# Patient Record
Sex: MALE | Race: WHITE | URBAN - METROPOLITAN AREA
[De-identification: names, ages, dates, MRNs, and addresses within clinical notes are randomized per-mention and may not be internally consistent; named-entity substitution may affect disease eponyms.]

---

## 2020-12-23 ENCOUNTER — APPOINTMENT (RX ONLY)
Dept: URBAN - METROPOLITAN AREA CLINIC 141 | Facility: CLINIC | Age: 55
Setting detail: DERMATOLOGY
End: 2020-12-23

## 2020-12-23 DIAGNOSIS — L20.89 OTHER ATOPIC DERMATITIS: ICD-10-CM

## 2020-12-23 DIAGNOSIS — L85.3 XEROSIS CUTIS: ICD-10-CM

## 2020-12-23 PROBLEM — L20.84 INTRINSIC (ALLERGIC) ECZEMA: Status: ACTIVE | Noted: 2020-12-23

## 2020-12-23 PROCEDURE — ? COUNSELING

## 2020-12-23 PROCEDURE — ? PRESCRIPTION

## 2020-12-23 PROCEDURE — 99202 OFFICE O/P NEW SF 15 MIN: CPT

## 2020-12-23 PROCEDURE — ? TREATMENT REGIMEN

## 2020-12-23 RX ORDER — CLOBETASOL PROPIONATE 0.5 MG/G
OINTMENT TOPICAL BID
Qty: 1 | Refills: 3 | Status: ERX | COMMUNITY
Start: 2020-12-23

## 2020-12-23 RX ADMIN — CLOBETASOL PROPIONATE: 0.5 OINTMENT TOPICAL at 00:00

## 2020-12-23 ASSESSMENT — LOCATION SIMPLE DESCRIPTION DERM
LOCATION SIMPLE: RIGHT PRETIBIAL REGION
LOCATION SIMPLE: LEFT PRETIBIAL REGION

## 2020-12-23 ASSESSMENT — LOCATION DETAILED DESCRIPTION DERM
LOCATION DETAILED: LEFT PROXIMAL PRETIBIAL REGION
LOCATION DETAILED: RIGHT PROXIMAL PRETIBIAL REGION

## 2020-12-23 ASSESSMENT — LOCATION ZONE DERM: LOCATION ZONE: LEG

## 2020-12-23 NOTE — PROCEDURE: TREATMENT REGIMEN
Initiate Treatment: Clobetasol ointment 0.05% topical ointment AAA on hands and legs BID PRN flares
Plan: Discussed with patient that he needs to avoid long and hot showers. Try to avoid hand immersion in water.\\nLather on thick moisturizer right out of the shower then, clobetasol over the moisturizer. \\n
Detail Level: Zone
Otc Regimen: Thick moisturizer in tub.
No-Patient/Caregiver offered and refused free interpretation services.

## 2021-12-20 ENCOUNTER — APPOINTMENT (RX ONLY)
Dept: URBAN - METROPOLITAN AREA CLINIC 141 | Facility: CLINIC | Age: 56
Setting detail: DERMATOLOGY
End: 2021-12-20

## 2021-12-20 DIAGNOSIS — L81.4 OTHER MELANIN HYPERPIGMENTATION: ICD-10-CM

## 2021-12-20 DIAGNOSIS — L20.89 OTHER ATOPIC DERMATITIS: ICD-10-CM

## 2021-12-20 DIAGNOSIS — Z71.89 OTHER SPECIFIED COUNSELING: ICD-10-CM

## 2021-12-20 DIAGNOSIS — D22 MELANOCYTIC NEVI: ICD-10-CM

## 2021-12-20 DIAGNOSIS — L82.1 OTHER SEBORRHEIC KERATOSIS: ICD-10-CM

## 2021-12-20 DIAGNOSIS — D18.0 HEMANGIOMA: ICD-10-CM

## 2021-12-20 PROBLEM — D22.5 MELANOCYTIC NEVI OF TRUNK: Status: ACTIVE | Noted: 2021-12-20

## 2021-12-20 PROBLEM — D18.01 HEMANGIOMA OF SKIN AND SUBCUTANEOUS TISSUE: Status: ACTIVE | Noted: 2021-12-20

## 2021-12-20 PROBLEM — L20.84 INTRINSIC (ALLERGIC) ECZEMA: Status: ACTIVE | Noted: 2021-12-20

## 2021-12-20 PROCEDURE — ? TREATMENT REGIMEN

## 2021-12-20 PROCEDURE — ? COUNSELING

## 2021-12-20 PROCEDURE — 99213 OFFICE O/P EST LOW 20 MIN: CPT

## 2021-12-20 PROCEDURE — ? PRESCRIPTION

## 2021-12-20 RX ORDER — CLOBETASOL PROPIONATE 0.5 MG/G
OINTMENT TOPICAL
Qty: 45 | Refills: 2 | Status: ERX | COMMUNITY
Start: 2021-12-20

## 2021-12-20 RX ADMIN — CLOBETASOL PROPIONATE: 0.5 OINTMENT TOPICAL at 00:00

## 2021-12-20 ASSESSMENT — LOCATION DETAILED DESCRIPTION DERM
LOCATION DETAILED: LEFT INFERIOR LATERAL UPPER BACK
LOCATION DETAILED: LEFT LATERAL UPPER BACK
LOCATION DETAILED: RIGHT KNEE
LOCATION DETAILED: RIGHT ULNAR DORSAL HAND
LOCATION DETAILED: LEFT KNEE
LOCATION DETAILED: LEFT RADIAL DORSAL HAND

## 2021-12-20 ASSESSMENT — LOCATION SIMPLE DESCRIPTION DERM
LOCATION SIMPLE: RIGHT KNEE
LOCATION SIMPLE: LEFT HAND
LOCATION SIMPLE: RIGHT HAND
LOCATION SIMPLE: LEFT UPPER BACK
LOCATION SIMPLE: LEFT KNEE

## 2021-12-20 ASSESSMENT — LOCATION ZONE DERM
LOCATION ZONE: TRUNK
LOCATION ZONE: HAND
LOCATION ZONE: LEG

## 2022-12-14 NOTE — PROCEDURE: TREATMENT REGIMEN
Procedure(s):  PHACO WITH IOL OS. MAC    Anesthesia Post Evaluation      Multimodal analgesia: multimodal analgesia used between 6 hours prior to anesthesia start to PACU discharge  Patient location during evaluation: bedside  Patient participation: complete - patient participated  Level of consciousness: awake and responsive to physical stimuli  Pain management: satisfactory to patient  Airway patency: patent  Anesthetic complications: no  Cardiovascular status: acceptable  Respiratory status: acceptable  Hydration status: acceptable  Comments: Patient is awake and comfortable post operatively. Report to RN at bedside.   Post anesthesia nausea and vomiting:  none  Final Post Anesthesia Temperature Assessment:  Normothermia (36.0-37.5 degrees C)      INITIAL Post-op Vital signs:   Vitals Value Taken Time   /92 12/14/22 1239   Temp 36.7 °C (98.1 °F) 12/14/22 1239   Pulse 75 12/14/22 1239   Resp 16 12/14/22 1239   SpO2 98 % 12/14/22 1239
Otc Regimen: Cerave or cetaphil moisturizer in tub
Continue Regimen: clobetasol 0.05% ointment
Detail Level: Generalized